# Patient Record
Sex: MALE | Race: BLACK OR AFRICAN AMERICAN | NOT HISPANIC OR LATINO | URBAN - METROPOLITAN AREA
[De-identification: names, ages, dates, MRNs, and addresses within clinical notes are randomized per-mention and may not be internally consistent; named-entity substitution may affect disease eponyms.]

---

## 2019-12-11 ENCOUNTER — OFFICE (OUTPATIENT)
Dept: URBAN - METROPOLITAN AREA CLINIC 9 | Facility: CLINIC | Age: 74
End: 2019-12-11
Payer: COMMERCIAL

## 2019-12-11 VITALS — HEIGHT: 69 IN | DIASTOLIC BLOOD PRESSURE: 82 MMHG | WEIGHT: 204 LBS | SYSTOLIC BLOOD PRESSURE: 137 MMHG

## 2019-12-11 DIAGNOSIS — R10.9 UNSPECIFIED ABDOMINAL PAIN: ICD-10-CM

## 2019-12-11 DIAGNOSIS — R12 HEARTBURN: ICD-10-CM

## 2019-12-11 PROCEDURE — 99202 OFFICE O/P NEW SF 15 MIN: CPT | Performed by: SPECIALIST

## 2019-12-11 NOTE — SERVICEHPINOTES
Patient sent over from [facility] for EGD/colonoscopy.  Records reviewed.  Following with [facility] and we are simply asked to perform indicated procedures.

## 2020-01-03 ENCOUNTER — AMBULATORY SURGICAL CENTER (OUTPATIENT)
Dept: URBAN - METROPOLITAN AREA SURGERY 2 | Facility: SURGERY | Age: 75
End: 2020-01-03
Payer: COMMERCIAL

## 2020-01-03 ENCOUNTER — OFFICE (OUTPATIENT)
Dept: URBAN - METROPOLITAN AREA PATHOLOGY 22 | Facility: PATHOLOGY | Age: 75
End: 2020-01-03
Payer: COMMERCIAL

## 2020-01-03 VITALS
TEMPERATURE: 98.2 F | HEART RATE: 84 BPM | OXYGEN SATURATION: 98 % | HEART RATE: 80 BPM | SYSTOLIC BLOOD PRESSURE: 92 MMHG | SYSTOLIC BLOOD PRESSURE: 91 MMHG | WEIGHT: 198 LBS | SYSTOLIC BLOOD PRESSURE: 91 MMHG | DIASTOLIC BLOOD PRESSURE: 55 MMHG | HEART RATE: 79 BPM | WEIGHT: 198 LBS | TEMPERATURE: 98.2 F | HEIGHT: 69 IN | TEMPERATURE: 97.6 F | HEIGHT: 69 IN | DIASTOLIC BLOOD PRESSURE: 76 MMHG | RESPIRATION RATE: 16 BRPM | OXYGEN SATURATION: 96 % | HEART RATE: 81 BPM | HEART RATE: 76 BPM | HEART RATE: 84 BPM | DIASTOLIC BLOOD PRESSURE: 53 MMHG | SYSTOLIC BLOOD PRESSURE: 97 MMHG | SYSTOLIC BLOOD PRESSURE: 92 MMHG | SYSTOLIC BLOOD PRESSURE: 119 MMHG | HEART RATE: 80 BPM | DIASTOLIC BLOOD PRESSURE: 50 MMHG | OXYGEN SATURATION: 100 % | HEART RATE: 76 BPM | HEART RATE: 81 BPM | DIASTOLIC BLOOD PRESSURE: 53 MMHG | HEART RATE: 84 BPM | DIASTOLIC BLOOD PRESSURE: 50 MMHG | OXYGEN SATURATION: 95 % | TEMPERATURE: 97.6 F | OXYGEN SATURATION: 98 % | OXYGEN SATURATION: 96 % | OXYGEN SATURATION: 100 % | DIASTOLIC BLOOD PRESSURE: 76 MMHG | TEMPERATURE: 97.6 F | TEMPERATURE: 98.2 F | OXYGEN SATURATION: 95 % | OXYGEN SATURATION: 95 % | HEART RATE: 79 BPM | OXYGEN SATURATION: 100 % | DIASTOLIC BLOOD PRESSURE: 53 MMHG | SYSTOLIC BLOOD PRESSURE: 92 MMHG | DIASTOLIC BLOOD PRESSURE: 55 MMHG | DIASTOLIC BLOOD PRESSURE: 50 MMHG | SYSTOLIC BLOOD PRESSURE: 97 MMHG | SYSTOLIC BLOOD PRESSURE: 97 MMHG | SYSTOLIC BLOOD PRESSURE: 119 MMHG | DIASTOLIC BLOOD PRESSURE: 55 MMHG | HEART RATE: 76 BPM | SYSTOLIC BLOOD PRESSURE: 119 MMHG | HEIGHT: 69 IN | OXYGEN SATURATION: 98 % | HEART RATE: 79 BPM | RESPIRATION RATE: 16 BRPM | SYSTOLIC BLOOD PRESSURE: 91 MMHG | DIASTOLIC BLOOD PRESSURE: 76 MMHG | RESPIRATION RATE: 16 BRPM | OXYGEN SATURATION: 96 % | HEART RATE: 81 BPM | WEIGHT: 198 LBS | HEART RATE: 80 BPM

## 2020-01-03 DIAGNOSIS — R12 HEARTBURN: ICD-10-CM

## 2020-01-03 DIAGNOSIS — K57.30 DIVERTICULOSIS OF LARGE INTESTINE WITHOUT PERFORATION OR ABS: ICD-10-CM

## 2020-01-03 DIAGNOSIS — K44.9 DIAPHRAGMATIC HERNIA WITHOUT OBSTRUCTION OR GANGRENE: ICD-10-CM

## 2020-01-03 DIAGNOSIS — K29.50 UNSPECIFIED CHRONIC GASTRITIS WITHOUT BLEEDING: ICD-10-CM

## 2020-01-03 DIAGNOSIS — K31.7 POLYP OF STOMACH AND DUODENUM: ICD-10-CM

## 2020-01-03 DIAGNOSIS — Z12.11 ENCOUNTER FOR SCREENING FOR MALIGNANT NEOPLASM OF COLON: ICD-10-CM

## 2020-01-03 DIAGNOSIS — I10 ESSENTIAL (PRIMARY) HYPERTENSION: ICD-10-CM

## 2020-01-03 PROBLEM — K31.89 OTHER DISEASES OF STOMACH AND DUODENUM: Status: ACTIVE | Noted: 2020-01-03

## 2020-01-03 PROCEDURE — 43239 EGD BIOPSY SINGLE/MULTIPLE: CPT | Mod: 51 | Performed by: SPECIALIST

## 2020-01-03 PROCEDURE — 88313 SPECIAL STAINS GROUP 2: CPT | Performed by: SPECIALIST

## 2020-01-03 PROCEDURE — 45378 DIAGNOSTIC COLONOSCOPY: CPT | Mod: 33 | Performed by: SPECIALIST

## 2020-01-03 PROCEDURE — 88342 IMHCHEM/IMCYTCHM 1ST ANTB: CPT | Performed by: SPECIALIST

## 2020-01-03 PROCEDURE — 88305 TISSUE EXAM BY PATHOLOGIST: CPT | Performed by: SPECIALIST
